# Patient Record
Sex: FEMALE | Race: NATIVE HAWAIIAN OR OTHER PACIFIC ISLANDER | NOT HISPANIC OR LATINO | Employment: UNEMPLOYED | URBAN - METROPOLITAN AREA
[De-identification: names, ages, dates, MRNs, and addresses within clinical notes are randomized per-mention and may not be internally consistent; named-entity substitution may affect disease eponyms.]

---

## 2020-03-11 ENCOUNTER — HOSPITAL ENCOUNTER (OUTPATIENT)
Facility: MEDICAL CENTER | Age: 30
End: 2020-03-11
Attending: NURSE PRACTITIONER
Payer: COMMERCIAL

## 2020-03-11 ENCOUNTER — INITIAL PRENATAL (OUTPATIENT)
Dept: OBGYN | Facility: CLINIC | Age: 30
End: 2020-03-11

## 2020-03-11 ENCOUNTER — APPOINTMENT (OUTPATIENT)
Dept: OBGYN | Facility: CLINIC | Age: 30
End: 2020-03-11
Payer: MEDICAID

## 2020-03-11 ENCOUNTER — HOSPITAL ENCOUNTER (OUTPATIENT)
Dept: LAB | Facility: MEDICAL CENTER | Age: 30
End: 2020-03-11
Attending: NURSE PRACTITIONER
Payer: COMMERCIAL

## 2020-03-11 VITALS — WEIGHT: 174 LBS | SYSTOLIC BLOOD PRESSURE: 100 MMHG | DIASTOLIC BLOOD PRESSURE: 70 MMHG

## 2020-03-11 DIAGNOSIS — Z34.83 ENCOUNTER FOR SUPERVISION OF OTHER NORMAL PREGNANCY, THIRD TRIMESTER: ICD-10-CM

## 2020-03-11 DIAGNOSIS — Z34.83 ENCOUNTER FOR SUPERVISION OF OTHER NORMAL PREGNANCY, THIRD TRIMESTER: Primary | ICD-10-CM

## 2020-03-11 DIAGNOSIS — O09.33 LATE PRENATAL CARE AFFECTING PREGNANCY IN THIRD TRIMESTER: ICD-10-CM

## 2020-03-11 LAB
ABO GROUP BLD: NORMAL
APPEARANCE UR: CLEAR
BACTERIA #/AREA URNS HPF: ABNORMAL /HPF
BASOPHILS # BLD AUTO: 0.4 % (ref 0–1.8)
BASOPHILS # BLD: 0.05 K/UL (ref 0–0.12)
BILIRUB UR QL STRIP.AUTO: NEGATIVE
BLD GP AB SCN SERPL QL: NORMAL
COLOR UR: YELLOW
EOSINOPHIL # BLD AUTO: 0.41 K/UL (ref 0–0.51)
EOSINOPHIL NFR BLD: 3.2 % (ref 0–6.9)
EPI CELLS #/AREA URNS HPF: ABNORMAL /HPF
ERYTHROCYTE [DISTWIDTH] IN BLOOD BY AUTOMATED COUNT: 40.8 FL (ref 35.9–50)
GLUCOSE UR STRIP.AUTO-MCNC: NEGATIVE MG/DL
HBV SURFACE AG SER QL: ABNORMAL
HCT VFR BLD AUTO: 36.7 % (ref 37–47)
HCV AB SER QL: NORMAL
HGB BLD-MCNC: 12.6 G/DL (ref 12–16)
HIV 1+2 AB+HIV1 P24 AG SERPL QL IA: NORMAL
HYALINE CASTS #/AREA URNS LPF: ABNORMAL /LPF
IMM GRANULOCYTES # BLD AUTO: 0.22 K/UL (ref 0–0.11)
IMM GRANULOCYTES NFR BLD AUTO: 1.7 % (ref 0–0.9)
KETONES UR STRIP.AUTO-MCNC: NEGATIVE MG/DL
LEUKOCYTE ESTERASE UR QL STRIP.AUTO: ABNORMAL
LYMPHOCYTES # BLD AUTO: 1.61 K/UL (ref 1–4.8)
LYMPHOCYTES NFR BLD: 12.6 % (ref 22–41)
MCH RBC QN AUTO: 29.9 PG (ref 27–33)
MCHC RBC AUTO-ENTMCNC: 34.3 G/DL (ref 33.6–35)
MCV RBC AUTO: 87 FL (ref 81.4–97.8)
MICRO URNS: ABNORMAL
MONOCYTES # BLD AUTO: 0.77 K/UL (ref 0–0.85)
MONOCYTES NFR BLD AUTO: 6 % (ref 0–13.4)
NEUTROPHILS # BLD AUTO: 9.71 K/UL (ref 2–7.15)
NEUTROPHILS NFR BLD: 76.1 % (ref 44–72)
NITRITE UR QL STRIP.AUTO: NEGATIVE
NRBC # BLD AUTO: 0 K/UL
NRBC BLD-RTO: 0 /100 WBC
PH UR STRIP.AUTO: 6.5 [PH] (ref 5–8)
PLATELET # BLD AUTO: 246 K/UL (ref 164–446)
PMV BLD AUTO: 9.3 FL (ref 9–12.9)
PROT UR QL STRIP: NEGATIVE MG/DL
RBC # BLD AUTO: 4.22 M/UL (ref 4.2–5.4)
RBC # URNS HPF: ABNORMAL /HPF
RBC UR QL AUTO: ABNORMAL
RH BLD: NORMAL
RUBV AB SER QL: 139 IU/ML
SP GR UR STRIP.AUTO: 1.02
TRANS CELLS #/AREA URNS HPF: ABNORMAL /HPF
TREPONEMA PALLIDUM IGG+IGM AB [PRESENCE] IN SERUM OR PLASMA BY IMMUNOASSAY: ABNORMAL
UROBILINOGEN UR STRIP.AUTO-MCNC: 1 MG/DL
WBC # BLD AUTO: 12.8 K/UL (ref 4.8–10.8)
WBC #/AREA URNS HPF: ABNORMAL /HPF

## 2020-03-11 PROCEDURE — 0500F INITIAL PRENATAL CARE VISIT: CPT | Performed by: NURSE PRACTITIONER

## 2020-03-11 PROCEDURE — 8198 PREG CTR PKG RATE (SYSTEM): Performed by: NURSE PRACTITIONER

## 2020-03-11 ASSESSMENT — ENCOUNTER SYMPTOMS
MUSCULOSKELETAL NEGATIVE: 1
NEUROLOGICAL NEGATIVE: 1
RESPIRATORY NEGATIVE: 1
GASTROINTESTINAL NEGATIVE: 1
CARDIOVASCULAR NEGATIVE: 1
EYES NEGATIVE: 1
CONSTITUTIONAL NEGATIVE: 1
PSYCHIATRIC NEGATIVE: 1

## 2020-03-11 NOTE — LETTER
"Count Your Baby's Movements  Another step to a healthy delivery    Lyudmila Serrano              Dept: [phone]    How Many Weeks Pregnant? 29w6d    Date to Begin Countin2020              How to use this chart    One way for your physician to keep track of your baby's health is by knowing how often the baby moves (or \"kicks\") in your womb.  You can help your physician to do this by using this chart every day.    Every day, you should see how many hours it takes for your baby to move 10 times.  Start in the morning, as soon as you get up.    · First, write down the time your baby moves until you get to 10.  · Check off one box every time your baby moves until you get to 10.  · Write down the time you finished counting in the last column.  · Total how long it took to count up all 10 movements.  · Finally, fill in the box that shows how long this took.  After counting 10 movements, you no longer have to count any more that day.  The next morning, just start counting again as soon as you get up.    What should you call a \"movement\"?  It is hard to say, because it will feel different from one mother to another and from one pregnancy to the next.  The important thing is that you count the movements the same way throughout your pregnancy.  If you have more questions, you should ask your physician.    Count carefully every day!  SAMPLE:  Week 28    How many hours did it take to feel 10 movements?       Start  Time     1     2     3     4     5     6     7     8     9     10   Finish Time   Mon 8:20 ·  ·  ·  ·  ·  ·  ·  ·  ·  ·  11:40                  Sat               Sun                 IMPORTANT: You should contact your physician if it takes more than two hours for you to feel 10 movements.  Each morning, write down the time and start to count the movements of your baby.  Keep track by checking off one box every time you feel one movement.  When you have " "felt 10 \"kicks\", write down the time you finished counting in the last column.  Then fill in the   box (over the check elie) for the number of hours it took.  Be sure to read the complete instructions on the previous page.            "

## 2020-03-11 NOTE — PROGRESS NOTES
Pt. Here for NOB visit today.  # [phone]  First prenatal care  Pt. States light cramping   Reports +FM.   Pharmacy verified  Pt declines CF  Chaperone offered and provided.  Pt declines TDap vaccine.

## 2020-03-11 NOTE — PROGRESS NOTES
S:  Lyudmila Serrano is a 29 y.o.  who presents for her new OB exam.  She is 29w6d with and CHAU of Estimated Date of Delivery: 20 based off of LMP . She has no complaints.  She is currently working at [employer], denies heavy lifting or chemical exposure. Denies ER visits or previous care in this pregnancy. Here from [facility].      Too Late for  AFP.  Declines CF.  Denies VB, LOF, or cramping.  Denies dysuria, vaginal DC. Reports normal fetal movement.     Pt is single and lives with other children and inlaws.  Pregnancy is unplanned but welcomed.      Discussed diet and exercise during pregnancy. Encouraged good nutrition, and daily exercise including walking or swimming. Discussed expected weight gain during pregnancy. Discussed adequate hydration during pregnancy.  Review of Systems   Constitutional: Negative.    HENT: Negative.    Eyes: Negative.    Respiratory: Negative.    Cardiovascular: Negative.    Gastrointestinal: Negative.    Genitourinary: Negative.    Musculoskeletal: Negative.    Skin: Negative.    Neurological: Negative.    Endo/Heme/Allergies: Negative.    Psychiatric/Behavioral: Negative.    All other systems reviewed and are negative.        History reviewed. No pertinent past medical history.  History reviewed. No pertinent family history.  Social History     Socioeconomic History   • Marital status: Single     Spouse name: Not on file   • Number of children: Not on file   • Years of education: Not on file   • Highest education level: Not on file   Occupational History   • Not on file   Social Needs   • Financial resource strain: Not on file   • Food insecurity     Worry: Not on file     Inability: Not on file   • Transportation needs     Medical: Not on file     Non-medical: Not on file   Tobacco Use   • Smoking status: Never Smoker   • Smokeless tobacco: Never Used   Substance and Sexual Activity   • Alcohol use: Not Currently   • Drug use: Not on file   • Sexual activity: Not  Currently     Birth control/protection: None   Lifestyle   • Physical activity     Days per week: Not on file     Minutes per session: Not on file   • Stress: Not on file   Relationships   • Social connections     Talks on phone: Not on file     Gets together: Not on file     Attends Gnosticism service: Not on file     Active member of club or organization: Not on file     Attends meetings of clubs or organizations: Not on file     Relationship status: Not on file   • Intimate partner violence     Fear of current or ex partner: Not on file     Emotionally abused: Not on file     Physically abused: Not on file     Forced sexual activity: Not on file   Other Topics Concern   • Not on file   Social History Narrative   • Not on file     OB History    Para Term  AB Living   3 2 2     2   SAB TAB Ectopic Molar Multiple Live Births             2      # Outcome Date GA Lbr Juan Carlos/2nd Weight Sex Delivery Anes PTL Lv   3 Current            2 Term      Vag-Spont   SUDHA   1 Term  40w0d    Vag-Spont   SUDHA       History of Varicella Virus: had as child  History of HSV I or II in self or partner: denies  History of Thyroid problems: denies    O:  Wt 78.9 kg (174 lb)    See Prenatal Physical.    Wet mount: denies  Physical Exam   Constitutional: She is oriented to person, place, and time and well-developed, well-nourished, and in no distress.   HENT:   Head: Normocephalic and atraumatic.   Nose: Nose normal.   Eyes: Pupils are equal, round, and reactive to light. Conjunctivae are normal.   Neck: Normal range of motion. Neck supple. No thyromegaly present.   Cardiovascular: Normal rate, regular rhythm, normal heart sounds and intact distal pulses.   Pulmonary/Chest: Effort normal and breath sounds normal.   Abdominal: Soft. Bowel sounds are normal.   Genitourinary:    Vagina normal.     Musculoskeletal: Normal range of motion.         General: No edema.   Neurological: She is alert and oriented to person, place, and time.  Gait normal.   Skin: Skin is warm and dry.   Psychiatric: Mood, memory, affect and judgment normal.   Nursing note and vitals reviewed.        A:   1.  IUP @ 29w6d per LMP        2.  S=D        3.  See problem list below        4.  Late prenatal care        5. Had negative pap 2 years ago     There are no active problems to display for this patient.        P:  1.  GC/CT         2.  Prenatal labs and 1 hr GTT ordered - lab slip given        3.  Discussed PNV, diet, avoidances and adequate water intake        4.  NOB packet given        5.  Return to office in 2 wks        6.  Complete OB US in ASAP wks            No orders of the defined types were placed in this encounter.

## 2020-03-12 LAB
C TRACH DNA SPEC QL NAA+PROBE: NEGATIVE
GLUCOSE 1H P 50 G GLC PO SERPL-MCNC: 81 MG/DL (ref 70–139)
N GONORRHOEA DNA SPEC QL NAA+PROBE: NEGATIVE
SPECIMEN SOURCE: NORMAL

## 2020-03-13 LAB
AMPHET CTO UR CFM-MCNC: NEGATIVE NG/ML
BARBITURATES CTO UR CFM-MCNC: NEGATIVE NG/ML
BENZODIAZ CTO UR CFM-MCNC: NEGATIVE NG/ML
CANNABINOIDS CTO UR CFM-MCNC: NEGATIVE NG/ML
COCAINE CTO UR CFM-MCNC: NEGATIVE NG/ML
DRUG COMMENT 753798: NORMAL
METHADONE CTO UR CFM-MCNC: NEGATIVE NG/ML
OPIATES CTO UR CFM-MCNC: NEGATIVE NG/ML
PCP CTO UR CFM-MCNC: NEGATIVE NG/ML
PROPOXYPH CTO UR CFM-MCNC: NEGATIVE NG/ML

## 2020-03-23 ENCOUNTER — APPOINTMENT (OUTPATIENT)
Dept: RADIOLOGY | Facility: IMAGING CENTER | Age: 30
End: 2020-03-23
Attending: NURSE PRACTITIONER

## 2020-03-23 ENCOUNTER — ROUTINE PRENATAL (OUTPATIENT)
Dept: OBGYN | Facility: CLINIC | Age: 30
End: 2020-03-23
Payer: MEDICAID

## 2020-03-23 VITALS — DIASTOLIC BLOOD PRESSURE: 70 MMHG | WEIGHT: 170 LBS | SYSTOLIC BLOOD PRESSURE: 112 MMHG

## 2020-03-23 DIAGNOSIS — Z34.83 ENCOUNTER FOR SUPERVISION OF OTHER NORMAL PREGNANCY IN THIRD TRIMESTER: ICD-10-CM

## 2020-03-23 DIAGNOSIS — Z34.83 ENCOUNTER FOR SUPERVISION OF OTHER NORMAL PREGNANCY, THIRD TRIMESTER: ICD-10-CM

## 2020-03-23 PROBLEM — Z34.93 ENCOUNTER FOR SUPERVISION OF NORMAL PREGNANCY, UNSPECIFIED, THIRD TRIMESTER: Status: ACTIVE | Noted: 2020-03-23

## 2020-03-23 PROCEDURE — 76805 OB US >/= 14 WKS SNGL FETUS: CPT | Mod: TC | Performed by: OBSTETRICS & GYNECOLOGY

## 2020-03-23 PROCEDURE — 90040 PR PRENATAL FOLLOW UP: CPT | Performed by: PHYSICIAN ASSISTANT

## 2020-03-23 NOTE — PROGRESS NOTES
Pt has no complaints with cramping, UCs, Vb, LOF, though pt has had incr pressure only. +FM. PNL,1 hr GTT, Hep C, UDS, GC/CT wnl - pt notified of results. Pt unsure about BTL, though is considering if female gender, so will re-ask next visit. PNV rx sent in today - pt isnt taking PNV yet, so d/w pt risk for constipation and how to incr water, fiber in diet. US done today - results pending. Will review with pt next visit or call prn. PTL precautions reviewed. Daily FKC recommended. RTC 2 wk or sooner prn.

## 2020-03-23 NOTE — PROGRESS NOTES
OB follow up   + fetal movement.  No VB, LOF or UC's.  Wt: 170lb      BP:112/70  Phone # [phone]  Preferred pharmacy confirmed.  BTL declined  Wants Rx for PNV

## 2020-03-26 DIAGNOSIS — O28.3 ABNORMAL PREGNANCY US: ICD-10-CM

## 2020-03-30 ENCOUNTER — ROUTINE PRENATAL (OUTPATIENT)
Dept: OBGYN | Facility: CLINIC | Age: 30
End: 2020-03-30

## 2020-03-30 VITALS — SYSTOLIC BLOOD PRESSURE: 124 MMHG | DIASTOLIC BLOOD PRESSURE: 70 MMHG | WEIGHT: 174 LBS

## 2020-03-30 DIAGNOSIS — Z34.83 ENCOUNTER FOR SUPERVISION OF OTHER NORMAL PREGNANCY IN THIRD TRIMESTER: ICD-10-CM

## 2020-03-30 PROCEDURE — 90040 PR PRENATAL FOLLOW UP: CPT | Performed by: PHYSICIAN ASSISTANT

## 2020-03-30 NOTE — PROGRESS NOTES
Pt has no complaints with cramping, UCs, Vb, LOF. +FM. US results wnl though incr cisterna magna seen, so Dr Xiong referral placed - pt hasnt gottne phone call for appt yet, so will call if no appt by 1 wk. Info given on Dr Xiong's office. Declines BTL and TDaP. RTC 2 wk or sooner prn.

## 2020-03-30 NOTE — PROGRESS NOTES
Pt here today for OB follow up  Pt still not sure about BTL  Reports +FM  Good # [phone]  Pharmacy Confirmed w/ pt   Declines T-dap

## 2020-04-17 ENCOUNTER — DOCUMENTATION (OUTPATIENT)
Dept: OBGYN | Facility: CLINIC | Age: 30
End: 2020-04-17

## 2020-05-21 ENCOUNTER — HOSPITAL ENCOUNTER (OUTPATIENT)
Facility: MEDICAL CENTER | Age: 30
End: 2020-05-21
Attending: NURSE PRACTITIONER
Payer: COMMERCIAL

## 2020-05-21 ENCOUNTER — ROUTINE PRENATAL (OUTPATIENT)
Dept: OBGYN | Facility: CLINIC | Age: 30
End: 2020-05-21

## 2020-05-21 VITALS — WEIGHT: 186.4 LBS | SYSTOLIC BLOOD PRESSURE: 110 MMHG | DIASTOLIC BLOOD PRESSURE: 76 MMHG

## 2020-05-21 DIAGNOSIS — Z34.83 ENCOUNTER FOR SUPERVISION OF OTHER NORMAL PREGNANCY IN THIRD TRIMESTER: ICD-10-CM

## 2020-05-21 DIAGNOSIS — Z34.83 ENCOUNTER FOR SUPERVISION OF OTHER NORMAL PREGNANCY IN THIRD TRIMESTER: Primary | ICD-10-CM

## 2020-05-21 PROCEDURE — 90040 PR PRENATAL FOLLOW UP: CPT | Performed by: NURSE PRACTITIONER

## 2020-05-21 NOTE — PATIENT INSTRUCTIONS
P:  1.  Continue FKCs.         2.  Labor precautions given.  Instructions given on where to go.  Pt receptive to education.         3.  D/w pt IOL policy.  IOL referral placed.        4.  Questions answered.         5.  Encouraged adequate water intake       6.  F/u 1wk       7.  Encouraged good hand hygiene, social distancing and avoiding sick contacts.       8.  L&D policies reviewed.       9.  GBS obtained.

## 2020-05-23 ENCOUNTER — HOSPITAL ENCOUNTER (INPATIENT)
Facility: MEDICAL CENTER | Age: 30
LOS: 1 days | End: 2020-05-24
Attending: OBSTETRICS & GYNECOLOGY | Admitting: OBSTETRICS & GYNECOLOGY
Payer: MEDICAID

## 2020-05-23 LAB
BASOPHILS # BLD AUTO: 0.2 % (ref 0–1.8)
BASOPHILS # BLD: 0.03 K/UL (ref 0–0.12)
EOSINOPHIL # BLD AUTO: 0.27 K/UL (ref 0–0.51)
EOSINOPHIL NFR BLD: 1.8 % (ref 0–6.9)
ERYTHROCYTE [DISTWIDTH] IN BLOOD BY AUTOMATED COUNT: 39.6 FL (ref 35.9–50)
ERYTHROCYTE [DISTWIDTH] IN BLOOD BY AUTOMATED COUNT: 39.7 FL (ref 35.9–50)
GP B STREP DNA SPEC QL NAA+PROBE: NEGATIVE
HCT VFR BLD AUTO: 35.1 % (ref 37–47)
HCT VFR BLD AUTO: 40.3 % (ref 37–47)
HGB BLD-MCNC: 12.5 G/DL (ref 12–16)
HGB BLD-MCNC: 14.2 G/DL (ref 12–16)
HOLDING TUBE BB 8507: NORMAL
IMM GRANULOCYTES # BLD AUTO: 0.13 K/UL (ref 0–0.11)
IMM GRANULOCYTES NFR BLD AUTO: 0.9 % (ref 0–0.9)
LYMPHOCYTES # BLD AUTO: 1.62 K/UL (ref 1–4.8)
LYMPHOCYTES NFR BLD: 10.8 % (ref 22–41)
MCH RBC QN AUTO: 30 PG (ref 27–33)
MCH RBC QN AUTO: 30.6 PG (ref 27–33)
MCHC RBC AUTO-ENTMCNC: 35.2 G/DL (ref 33.6–35)
MCHC RBC AUTO-ENTMCNC: 35.6 G/DL (ref 33.6–35)
MCV RBC AUTO: 85.2 FL (ref 81.4–97.8)
MCV RBC AUTO: 86 FL (ref 81.4–97.8)
MONOCYTES # BLD AUTO: 0.63 K/UL (ref 0–0.85)
MONOCYTES NFR BLD AUTO: 4.2 % (ref 0–13.4)
NEUTROPHILS # BLD AUTO: 12.28 K/UL (ref 2–7.15)
NEUTROPHILS NFR BLD: 82.1 % (ref 44–72)
NRBC # BLD AUTO: 0 K/UL
NRBC BLD-RTO: 0 /100 WBC
PLATELET # BLD AUTO: 194 K/UL (ref 164–446)
PLATELET # BLD AUTO: 220 K/UL (ref 164–446)
PMV BLD AUTO: 9.4 FL (ref 9–12.9)
PMV BLD AUTO: 9.5 FL (ref 9–12.9)
RBC # BLD AUTO: 4.08 M/UL (ref 4.2–5.4)
RBC # BLD AUTO: 4.73 M/UL (ref 4.2–5.4)
WBC # BLD AUTO: 15 K/UL (ref 4.8–10.8)
WBC # BLD AUTO: 17.7 K/UL (ref 4.8–10.8)

## 2020-05-23 PROCEDURE — 0KQM0ZZ REPAIR PERINEUM MUSCLE, OPEN APPROACH: ICD-10-PCS | Performed by: OBSTETRICS & GYNECOLOGY

## 2020-05-23 PROCEDURE — 36415 COLL VENOUS BLD VENIPUNCTURE: CPT

## 2020-05-23 PROCEDURE — 59409 OBSTETRICAL CARE: CPT | Performed by: OBSTETRICS & GYNECOLOGY

## 2020-05-23 PROCEDURE — 10907ZC DRAINAGE OF AMNIOTIC FLUID, THERAPEUTIC FROM PRODUCTS OF CONCEPTION, VIA NATURAL OR ARTIFICIAL OPENING: ICD-10-PCS | Performed by: OBSTETRICS & GYNECOLOGY

## 2020-05-23 PROCEDURE — 700111 HCHG RX REV CODE 636 W/ 250 OVERRIDE (IP): Performed by: NURSE PRACTITIONER

## 2020-05-23 PROCEDURE — 770002 HCHG ROOM/CARE - OB PRIVATE (112)

## 2020-05-23 PROCEDURE — 85027 COMPLETE CBC AUTOMATED: CPT

## 2020-05-23 PROCEDURE — 3E0234Z INTRODUCTION OF SERUM, TOXOID AND VACCINE INTO MUSCLE, PERCUTANEOUS APPROACH: ICD-10-PCS | Performed by: OBSTETRICS & GYNECOLOGY

## 2020-05-23 PROCEDURE — 304965 HCHG RECOVERY SERVICES

## 2020-05-23 PROCEDURE — 85025 COMPLETE CBC W/AUTO DIFF WBC: CPT

## 2020-05-23 PROCEDURE — 700101 HCHG RX REV CODE 250

## 2020-05-23 PROCEDURE — 59409 OBSTETRICAL CARE: CPT

## 2020-05-23 PROCEDURE — 90471 IMMUNIZATION ADMIN: CPT

## 2020-05-23 PROCEDURE — 700105 HCHG RX REV CODE 258: Performed by: NURSE PRACTITIONER

## 2020-05-23 PROCEDURE — 700111 HCHG RX REV CODE 636 W/ 250 OVERRIDE (IP)

## 2020-05-23 PROCEDURE — 90715 TDAP VACCINE 7 YRS/> IM: CPT

## 2020-05-23 RX ORDER — DOCUSATE SODIUM 100 MG/1
100 CAPSULE, LIQUID FILLED ORAL 2 TIMES DAILY PRN
Status: DISCONTINUED | OUTPATIENT
Start: 2020-05-23 | End: 2020-05-24 | Stop reason: HOSPADM

## 2020-05-23 RX ORDER — VITAMIN A ACETATE, BETA CAROTENE, ASCORBIC ACID, CHOLECALCIFEROL, .ALPHA.-TOCOPHEROL ACETATE, DL-, THIAMINE MONONITRATE, RIBOFLAVIN, NIACINAMIDE, PYRIDOXINE HYDROCHLORIDE, FOLIC ACID, CYANOCOBALAMIN, CALCIUM CARBONATE, FERROUS FUMARATE, ZINC OXIDE, CUPRIC OXIDE 3080; 12; 120; 400; 1; 1.84; 3; 20; 22; 920; 25; 200; 27; 10; 2 [IU]/1; UG/1; MG/1; [IU]/1; MG/1; MG/1; MG/1; MG/1; MG/1; [IU]/1; MG/1; MG/1; MG/1; MG/1; MG/1
1 TABLET, FILM COATED ORAL EVERY MORNING
Status: DISCONTINUED | OUTPATIENT
Start: 2020-05-23 | End: 2020-05-24 | Stop reason: HOSPADM

## 2020-05-23 RX ORDER — METHYLERGONOVINE MALEATE 0.2 MG/ML
0.2 INJECTION INTRAVENOUS
Status: DISCONTINUED | OUTPATIENT
Start: 2020-05-23 | End: 2020-05-23 | Stop reason: HOSPADM

## 2020-05-23 RX ORDER — ONDANSETRON 4 MG/1
4 TABLET, ORALLY DISINTEGRATING ORAL EVERY 6 HOURS PRN
Status: DISCONTINUED | OUTPATIENT
Start: 2020-05-23 | End: 2020-05-24 | Stop reason: HOSPADM

## 2020-05-23 RX ORDER — LIDOCAINE HYDROCHLORIDE 10 MG/ML
INJECTION, SOLUTION INFILTRATION; PERINEURAL
Status: COMPLETED
Start: 2020-05-23 | End: 2020-05-23

## 2020-05-23 RX ORDER — MISOPROSTOL 200 UG/1
800 TABLET ORAL
Status: DISCONTINUED | OUTPATIENT
Start: 2020-05-23 | End: 2020-05-23 | Stop reason: HOSPADM

## 2020-05-23 RX ORDER — IBUPROFEN 600 MG/1
600 TABLET ORAL EVERY 6 HOURS PRN
Status: DISCONTINUED | OUTPATIENT
Start: 2020-05-23 | End: 2020-05-24 | Stop reason: HOSPADM

## 2020-05-23 RX ORDER — BISACODYL 10 MG
10 SUPPOSITORY, RECTAL RECTAL PRN
Status: DISCONTINUED | OUTPATIENT
Start: 2020-05-23 | End: 2020-05-24 | Stop reason: HOSPADM

## 2020-05-23 RX ORDER — ONDANSETRON 2 MG/ML
4 INJECTION INTRAMUSCULAR; INTRAVENOUS EVERY 6 HOURS PRN
Status: DISCONTINUED | OUTPATIENT
Start: 2020-05-23 | End: 2020-05-24 | Stop reason: HOSPADM

## 2020-05-23 RX ORDER — SODIUM CHLORIDE, SODIUM LACTATE, POTASSIUM CHLORIDE, CALCIUM CHLORIDE 600; 310; 30; 20 MG/100ML; MG/100ML; MG/100ML; MG/100ML
INJECTION, SOLUTION INTRAVENOUS CONTINUOUS
Status: ACTIVE | OUTPATIENT
Start: 2020-05-23 | End: 2020-05-23

## 2020-05-23 RX ORDER — ACETAMINOPHEN 325 MG/1
650 TABLET ORAL EVERY 4 HOURS PRN
Status: DISCONTINUED | OUTPATIENT
Start: 2020-05-23 | End: 2020-05-24 | Stop reason: HOSPADM

## 2020-05-23 RX ORDER — SODIUM CHLORIDE, SODIUM LACTATE, POTASSIUM CHLORIDE, CALCIUM CHLORIDE 600; 310; 30; 20 MG/100ML; MG/100ML; MG/100ML; MG/100ML
INJECTION, SOLUTION INTRAVENOUS PRN
Status: DISCONTINUED | OUTPATIENT
Start: 2020-05-23 | End: 2020-05-24 | Stop reason: HOSPADM

## 2020-05-23 RX ADMIN — TETANUS TOXOID, REDUCED DIPHTHERIA TOXOID AND ACELLULAR PERTUSSIS VACCINE, ADSORBED 0.5 ML: 5; 2.5; 8; 8; 2.5 SUSPENSION INTRAMUSCULAR at 22:20

## 2020-05-23 RX ADMIN — OXYTOCIN 125 ML/HR: 10 INJECTION, SOLUTION INTRAMUSCULAR; INTRAVENOUS at 10:20

## 2020-05-23 RX ADMIN — LIDOCAINE HYDROCHLORIDE: 10 INJECTION, SOLUTION INFILTRATION; PERINEURAL at 09:30

## 2020-05-23 RX ADMIN — SODIUM CHLORIDE, POTASSIUM CHLORIDE, SODIUM LACTATE AND CALCIUM CHLORIDE: 600; 310; 30; 20 INJECTION, SOLUTION INTRAVENOUS at 07:55

## 2020-05-23 RX ADMIN — OXYTOCIN 2000 ML/HR: 10 INJECTION, SOLUTION INTRAMUSCULAR; INTRAVENOUS at 09:09

## 2020-05-23 SDOH — ECONOMIC STABILITY: FOOD INSECURITY: WITHIN THE PAST 12 MONTHS, THE FOOD YOU BOUGHT JUST DIDN'T LAST AND YOU DIDN'T HAVE MONEY TO GET MORE.: NEVER TRUE

## 2020-05-23 SDOH — ECONOMIC STABILITY: FOOD INSECURITY: WITHIN THE PAST 12 MONTHS, YOU WORRIED THAT YOUR FOOD WOULD RUN OUT BEFORE YOU GOT MONEY TO BUY MORE.: NEVER TRUE

## 2020-05-23 SDOH — ECONOMIC STABILITY: TRANSPORTATION INSECURITY
IN THE PAST 12 MONTHS, HAS THE LACK OF TRANSPORTATION KEPT YOU FROM MEDICAL APPOINTMENTS OR FROM GETTING MEDICATIONS?: NO

## 2020-05-23 SDOH — ECONOMIC STABILITY: TRANSPORTATION INSECURITY
IN THE PAST 12 MONTHS, HAS LACK OF TRANSPORTATION KEPT YOU FROM MEETINGS, WORK, OR FROM GETTING THINGS NEEDED FOR DAILY LIVING?: NO

## 2020-05-23 ASSESSMENT — LIFESTYLE VARIABLES: EVER_SMOKED: NEVER

## 2020-05-23 ASSESSMENT — EDINBURGH POSTNATAL DEPRESSION SCALE (EPDS)
THINGS HAVE BEEN GETTING ON TOP OF ME: YES, SOMETIMES I HAVEN'T BEEN COPING AS WELL AS USUAL
I HAVE BEEN ANXIOUS OR WORRIED FOR NO GOOD REASON: HARDLY EVER
THE THOUGHT OF HARMING MYSELF HAS OCCURRED TO ME: NEVER
I HAVE BEEN SO UNHAPPY THAT I HAVE HAD DIFFICULTY SLEEPING: NOT VERY OFTEN
I HAVE FELT SAD OR MISERABLE: NO, NOT AT ALL
I HAVE LOOKED FORWARD WITH ENJOYMENT TO THINGS: AS MUCH AS I EVER DID
I HAVE FELT SCARED OR PANICKY FOR NO GOOD REASON: YES, SOMETIMES
I HAVE BEEN ABLE TO LAUGH AND SEE THE FUNNY SIDE OF THINGS: AS MUCH AS I ALWAYS COULD
I HAVE BLAMED MYSELF UNNECESSARILY WHEN THINGS WENT WRONG: NOT VERY OFTEN
I HAVE BEEN SO UNHAPPY THAT I HAVE BEEN CRYING: NO, NEVER

## 2020-05-23 ASSESSMENT — PATIENT HEALTH QUESTIONNAIRE - PHQ9
SUM OF ALL RESPONSES TO PHQ9 QUESTIONS 1 AND 2: 0
1. LITTLE INTEREST OR PLEASURE IN DOING THINGS: NOT AT ALL
2. FEELING DOWN, DEPRESSED, IRRITABLE, OR HOPELESS: NOT AT ALL

## 2020-05-23 ASSESSMENT — ENCOUNTER SYMPTOMS
MUSCULOSKELETAL NEGATIVE: 1
CONSTITUTIONAL NEGATIVE: 1
CARDIOVASCULAR NEGATIVE: 1
GASTROINTESTINAL NEGATIVE: 1
EYES NEGATIVE: 1
NEUROLOGICAL NEGATIVE: 1
RESPIRATORY NEGATIVE: 1
PSYCHIATRIC NEGATIVE: 1

## 2020-05-23 NOTE — LACTATION NOTE
Spoke with mom to introduce lactation services and assist with breastfeeding. Mom states she nursed her now 9 year old for one year. Nipples everted bilaterally and breasts appear wide spaced.     Baby alert and fussy. Asked mom if I could assist her with latch and she declined saying she was OK.     Baby is now 5 hours old and has nursed twice and had one stool and one urine diaper. Mom encouraged to call for RN/LC assistance as needed.

## 2020-05-23 NOTE — PROGRESS NOTES
"Progress Note    Subjective: Patient is a 29-year-old G3, P2 at 40 weeks and 2 days who was admitted in active labor.  Patient desires no pain medication during labor.  She has no complaints currently    Objective Data:  Recent Labs     05/23/20  0750   WBC 15.0*   RBC 4.73   HEMOGLOBIN 14.2   HEMATOCRIT 40.3   MCV 85.2   MCH 30.0   MCHC 35.2*   RDW 39.7   PLATELETCT 220   MPV 9.5           Vitals:    05/23/20 0700 05/23/20 0731 05/23/20 0800   BP:  123/83    Pulse:  94    Resp:   20   Temp:   36.5 °C (97.7 °F)   Weight: 79.4 kg (175 lb)     Height: 1.575 m (5' 2\")       SVE: 7 cm/100/-1  AROM clr    FHR tracing:  Fetal heart rate baseline is in the 140s with accelerations and rare variable decelerations.  Moderate fetal heart rate variability is present.  On tocometer irregular contractions were noted 2 to 3 minutes apart.  Fetal heart rate tracing is currently category 1      Current Facility-Administered Medications   Medication Dose Route Frequency Provider Last Rate Last Dose   • LR infusion   Intravenous Continuous Bernadette Ervin, A.P.R.N.       • fentaNYL (SUBLIMAZE) injection 50 mcg  50 mcg Intravenous Q HOUR PRN Bernadette Butler, A.P.R.N.       • fentaNYL (SUBLIMAZE) injection 100 mcg  100 mcg Intravenous Q HOUR PRN Bernadette Ervin, A.P.R.N.       • miSOPROStol (CYTOTEC) tablet 800 mcg  800 mcg Rectal Once PRN Bernadette Butler, A.P.R.N.       • methylergonovine (METHERGINE) injection 0.2 mg  0.2 mg Intramuscular Once PRN Bernadette Ervin, A.P.R.N.       • oxytocin (PITOCIN) 20 UNITS/1000ML LR                Assessment:  29-year-old G3, P2 at 40 weeks and 2 days gestation in active labor  Status post amniotomy with clear fluid  Category 1 fetal heart rate tracing  Desires no pain medication    Plan:  We will continue present management  Labor management and plan of care reviewed  "

## 2020-05-23 NOTE — PROGRESS NOTES
0725- Pt here for OB check monitros applied, sve 7/100/-1  0755- IV started by Dr. Cunningham  0800- admit complete  0909-  viable female infant apgars 8/9  1045- pt up to the bathroom  1100- pt transferred to room S323, bedside report given to Geri WEBER RN- poc discussed

## 2020-05-23 NOTE — L&D DELIVERY NOTE
[facility] SPONTANEOUS VAGINAL DELIVERY PRODEDURE NOTE    PATIENT ID:  NAME:  Lyudmila Serrano  MRN:               [id number]  YOB: 1990    On 2020  at 09:09, this 29 y.o., now  40w2d , GBS unknown female delivered via  under no anesthesia a viable female infant, weight pending, with APGAR scores of 8 and 9 at one and five minutes. There was no nuchal cord and was bulb suctioned at delivery. Cord was doubly clamped, cut and infant handed to RN in attendance. An intact placenta was delivered spontaneously at 09:14 with 3 vessel cord. Upon vaginal exam, there was a 2nd degree prineal laceration which was repaired using 3.0 Vicryl in the usual sterile fashion, with 10 mL of 1% lidocaine used for local anesthetic during the repair. Estimated blood loss was 200cc. Patient will be transferred to postpartum in stable condition and infant to  nursery.    Delivery attended by Dr. Glenn Grant who was present for the entire delivery.

## 2020-05-23 NOTE — H&P
Obstetrics & Gynecology History & Physical Note    Date of Service  2020    Chief Complaint  Contractions    History of Presenting Illness  Lyudmila Serrano is a 29 y.o.  at 40w2d 2020, by Last Menstrual Period. Patient's last menstrual period was 08/15/2019. She is being admitted for labor management.  Her contractions started yesterday and continued through the day.  They started to get stronger and closer last night but not strong enough to come in until this morning.  She denies LOF, vaginal bleeding but did have some spotting yesterday when her contractions started.      Prenatal care is at [facility] starting at 29 weeks for a total of 4 visits and is complicated by:      Patient Active Problem List    Diagnosis Date Noted   • Abnormal pregnancy US - Dr Xiong US WNL 2020   • Encounter for supervision of normal pregnancy 2020   • Late prenatal care affecting pregnancy in third trimester 2020       Obstetric History  OB History    Para Term  AB Living   3 2 2     2   SAB TAB Ectopic Molar Multiple Live Births             2      # Outcome Date GA Lbr Juan Carlos/2nd Weight Sex Delivery Anes PTL Lv   3 Current            2 Term 08 40w0d  2.722 kg (6 lb) M Vag-Spont  N SUDHA   1 Term 07 40w0d  3.175 kg (7 lb) M Vag-Spont   SUDHA       Gynecologic History  Denies hx of abnormal pap, STI or HSV    Review of Systems  Review of Systems   Constitutional: Negative.    HENT: Negative.    Eyes: Negative.    Respiratory: Negative.    Cardiovascular: Negative.    Gastrointestinal: Negative.    Genitourinary: Negative.    Musculoskeletal: Negative.    Skin: Negative.    Neurological: Negative.    Endo/Heme/Allergies: Negative.    Psychiatric/Behavioral: Negative.    All other systems reviewed and are negative.      Medical History   has no past medical history on file.    Surgical History   has no past surgical history on file.     Family History  family history is not on file.     Social  History   reports that she has never smoked. She has never used smokeless tobacco. She reports previous alcohol use.    Allergies  No Known Allergies    Medications  Prior to Admission Medications   Prescriptions Last Dose Informant Patient Reported? Taking?   Prenatal Multivit-Min-Fe-FA (PRENATAL VITAMINS) 0.8 MG Tab   No No   Sig: Take 1 Tab by mouth every day.      Facility-Administered Medications: None       Physical Exam  Vitals:    05/23/20 0700   Weight: 83.9 kg (185 lb)       General:   alert, cooperative, no distress   Skin:   normal   HEENT:  PERRLA and extraocular movements intact   Lungs:   clear to auscultation bilaterally   Heart:   regular rate and rhythm, no pedal edema   Breasts:   deferred   Abdomen:  Abdomen soft, non-tender; gravid.   Pelvis: EFW: 7lbs   FHT:  135  BPM Fetal heart variability: moderate  Fetal Heart Rate decelerations: variable  Fetal Heart Rate accelerations: yes   [place]:     Presentations:   vertex by RN VE   Cervix:     Dilation:  7    Effacement:  100    Station:   -2    Consistency:      Position:         Laboratory:  Prenatal Results     General (Most Recent Result)     Test Value Reference Range Date Time    ABO O   03/11/20 1550    Rh POS   03/11/20 1550    Antibody screen NEG   03/11/20 1550    HbA1c        Gonorrhea Negative  Negative 03/11/20 1622    Chlamydia  by PCR Negative  Negative 03/11/20 1622    Chlamydia by DNA        RPR/Syphilus Non-Reactive  Non-Reactive 03/11/20 1550    HSV 1/2 by PCR (non-serum)        HSV 1/2 (serum)        HSV 1        HSV 2        HPV (16)        HPV (18)        HPV (other)        HBsAg Non-Reactive  Non-Reactive 03/11/20 1550    HIV-1 HIV-2 Antibodies Non-Reactive  Non Reactive 03/11/20 1550    Rubella 139.00 IU/mL  03/11/20 1550    Tb              Pap Smear (Most Recent Result)     Test Value Reference Range Date Time    Pap smear        Pap smear w/HPV        Pap smear w/CTNG        Pap smar w/HPV CTNG        Pap smear (reflex HPV  ACUS)        Pap smear (reflex HPV ASCUS w/CTNG)        Pathology gyn specimen              Urinalysis (Most Recent Result)     Test Value Reference Range Date Time    Urinalysis        Urinalysis (culture if indicated)        POC urinalysis        Urine drug screen        Urine drug screen (w/o conf)        Urine culture ([id number])        Urine culture ([id number])              1st Trimester     Test Value Reference Range Date Time    Hgb        Hct        Fasting Glucose Tolerance        GTT, 1 hour        GTT, 2 hours        GTT, 3 hours              2nd Trimester     Test Value Reference Range Date Time    Hgb        Hct        Urinalysis        Urine Culture        AST        ALT        Uric Acid        Fasting Glucose Tolerance        GTT, 1 hour        GTT, 2 hours        GTT, 3 hours        Urine Protein/Creatinine Ratio              3rd Trimester     Test Value Reference Range Date Time    Hgb 12.6 g/dL 12.0 - 16.0 20 1550    Hct 36.7 % 37.0 - 47.0 20 1550    Platelet count 246 K/uL 164 - 446 20 1550    GBS (KRISHNA BROTH)        GBS (Direct)        Urinalysis        Urine Culture        Urine Drug Screen        Urine Protein/Creatinine Ratio              Congenital Disease Screening     Test Value Reference Range Date Time    First Trimester Screen        Quad Screen        Sickle Cell        Thalassemia        [facility]        Cystic Fibrosis Carrier Study                      Urinalysis:    No results found     Imagin2020 Dr Xiong, normal anatomy scan, SURYA 21      Assessment:  29 y.o.  40w2d who presents with   Active labor  Intact membranes    Plan:  No new Assessment & Plan notes have been filed under this hospital service since the last note was generated.  Service: Obstetrics & Gynecology    1. Admit to L&D  2. Anticipate   3.GBS: pending, collected , no risk factors to treat  4. Pain Control: desires unmedicated    VTE prophylaxis: pt is ambulatory    Bernadette Mueller  BRAD burciaga MD

## 2020-05-24 VITALS
TEMPERATURE: 98.7 F | HEART RATE: 77 BPM | RESPIRATION RATE: 18 BRPM | SYSTOLIC BLOOD PRESSURE: 115 MMHG | WEIGHT: 175 LBS | OXYGEN SATURATION: 97 % | BODY MASS INDEX: 32.2 KG/M2 | HEIGHT: 62 IN | DIASTOLIC BLOOD PRESSURE: 73 MMHG

## 2020-05-24 NOTE — PROGRESS NOTES
1120- Patient arrived to Room S323.  Report received from ARNULFO Barry RN.  Patient assessment done.  IV patent, infusing LR with 20 units pitocin at 125 ml/hr.  Discussed pain management with patient. Patient prefers to call for pain intervention as needed.  Patient oriented to room, call system, and infant security.  Reviewed plan of care.  Patient verbalized understanding.  FOB at bedside.

## 2020-05-24 NOTE — PROGRESS NOTES
Assessment completed. Fundus firm and light lochia.  VSS. Patient denies pain at this time. Will call if pain med intervention needed.

## 2020-05-24 NOTE — DISCHARGE INSTRUCTIONS
POSTPARTUM DISCHARGE INSTRUCTIONS FOR MOM    YOB: 1990   Age: 29 y.o.               Admit Date: 2020     Discharge Date: 2020  Attending Doctor:  Glenn Grant M.D.                  Allergies:  Patient has no known allergies.    Discharged to home by car. Discharged via wheelchair, hospital escort: Yes.  Special equipment needed: Not Applicable  Belongings with: Personal  Be sure to schedule a follow-up appointment with your primary care doctor or any specialists as instructed.     Discharge Plan:   Diet Plan: Discussed  Activity Level: Discussed  Confirmed Follow up Appointment: Patient to Call and Schedule Appointment  Confirmed Symptoms Management: Discussed  Medication Reconciliation Updated: Yes    REASONS TO CALL YOUR OBSTETRICIAN:  1.   Persistent fever or shaking chills (Temperature higher than 100.4)  2.   Heavy bleeding (soaking more than 1 pad per hour); Passing clots  3.   Foul odor from vagina  4.   Mastitis (Breast infection; breast pain, chills, fever, redness)  5.   Urinary pain, burning or frequency  6.   Episiotomy infection  7.   Abdominal incision infection  8.   Severe depression longer than 24 hours    HAND WASHING  · Prior to handling the baby.  · Before breastfeeding or bottle feeding baby.  · After using the bathroom or changing the baby's diaper.    WOUND CARE  Ask your physician for additional care instructions.  In general:    ·  Incision:      · Keep clean and dry.    · Do NOT lift anything heavier than your baby for up to 6 weeks.    · There should not be any opening or pus.      VAGINAL CARE  · Nothing inside vagina for 6 weeks: no sexual intercourse, tampons or douching.  · Bleeding may continue for 2-4 weeks.  Amount may vary.    · Call your physician for heavy bleeding which means soaking more than 1 pad per hour    BIRTH CONTROL  · It is possible to become pregnant at any time after delivery and while breastfeeding.  · Plan to discuss a method of  "birth control with your physician at your follow up visit. visit.    DIET AND ELIMINATION  · Eating more fiber (bran cereal, fruits, and vegetables) and drinking plenty of fluids will help to avoid constipation.  · Urinary frequency after childbirth is normal.    POSTPARTUM BLUES  During the first few days after birth, you may experience a sense of the \"blues\" which may include impatience, irritability or even crying.  These feeling come and go quickly.  However, as many as 1 in 10 women experience emotional symptoms known as postpartum depression.    Postpartum depression:  May start as early as the second or third day after delivery or take several weeks or months to develop.  Symptoms of \"blues\" are present, but are more intense:  Crying spells; loss of appetite; feelings of hopelessness or loss of control; fear of touching the baby; over concern or no concern at all about the baby; little or no concern about your own appearance/caring for yourself; and/or inability to sleep or excessive sleeping.  Contact your physician if you are experiencing any of these symptoms.    Crisis Hotline:  · [place] Crisis Hotline:  4-836-AVCKWZN  Or 1-939.548.1076  · [place] Crisis Hotline:  1-498.652.2946  Or 828-404-0338    PREVENTING SHAKEN BABY:  If you are angry or stressed, PUT THE BABY IN THE CRIB, step away, take some deep breaths, and wait until you are calm to care for the baby.  DO NOT SHAKE THE BABY.  You are not alone, call a supporter for help.    · Crisis Call Center 24/7 crisis line 935-364-6926 or 1-386.819.6787  · You can also text them, text \"ANSWER\" to 005534    QUIT SMOKING/TOBACCO USE:  I understand the use of any tobacco products increases my chance of suffering from future heart disease and could cause other illnesses which may shorten my life. Quitting the use of tobacco products is the single most important thing I can do to improve my health. For further information on smoking / tobacco cessation call a " Toll Free Quit Line at 1-350.454.4278 (*National Cancer [place]) or 1-601.199.6166 (American Lung Association) or you can access the web based program at www.lungusa.org.    · [place] Tobacco Users Help Line:  (836) 369-2919       Toll Free: 1-407.336.6021  · Quit Tobacco Program [facility] (728)085-6739    DEPRESSION / SUICIDE RISK:  As you are discharged from this [facility], it is important to learn how to keep safe from harming yourself.    Recognize the warning signs:  · Abrupt changes in personality, positive or negative- including increase in energy   · Giving away possessions  · Change in eating patterns- significant weight changes-  positive or negative  · Change in sleeping patterns- unable to sleep or sleeping all the time   · Unwillingness or inability to communicate  · Depression  · Unusual sadness, discouragement and loneliness  · Talk of wanting to die  · Neglect of personal appearance   · Rebelliousness- reckless behavior  · Withdrawal from people/activities they love  · Confusion- inability to concentrate     If you or a loved one observes any of these behaviors or has concerns about self-harm, here's what you can do:  · Talk about it- your feelings and reasons for harming yourself  · Remove any means that you might use to hurt yourself (examples: pills, rope, extension cords, firearm)  · Get professional help from the community (Mental Health, Substance Abuse, psychological counseling)  · Do not be alone:Call your Safe Contact- someone whom you trust who will be there for you.  · Call your local CRISIS HOTLINE 515-2858 or 248-310-2210  · Call your local Children's Mobile Crisis Response Team [place] (626) 820-4336 or www.[employer]  · Call the toll free National Suicide Prevention Hotlines   · National Suicide Prevention Lifeline 730-715-ZDVJ (4093)  · National Hope Line Network 800-SUICIDE (322-5126)    DISCHARGE SURVEY:  Thank you for choosing  [facility].  We hope we provided you with very good care.  You may be receiving a survey in the mail.  Please fill it out.  Your opinion is valuable to us.    ADDITIONAL EDUCATIONAL MATERIALS GIVEN TO PATIENT:        My signature on this form indicates that:  1.  I have reviewed and understand the above information  2.  My questions regarding this information have been answered to my satisfaction.  3.  I have formulated a plan with my discharge nurse to obtain my prescribed medication for home.

## 2020-05-24 NOTE — PROGRESS NOTES
Pt discharged at approximately 1305 via wheelchair with hospital escort. Infant placed in carseat by parent and checked by RN. PT discharge instructions provided approximately 1100 no prescriptions ordered by MD. Checked armbands. Clamp and cuddles removed. No further questions at this time.

## 2020-05-24 NOTE — LACTATION NOTE
Mother reports she breast fed her other children for 1 year each. Breasts are widely spaced and nipples are large. She reports she produced plenty of milk for her other children and she feels breastfeeding is going well. She reports her nipples are slightly sore, reviewed positioning at breast and methods to achieve deeper latch. Infant sleepy and not ready to feed, encouraged mother to call for assist at next feed if techniques to deepen latch are not effective. Reviewed waking techniques, frequency/duration of feeds, hunger cues, cluster feeding, and expected infant urine/stool output. Plan is to breast feed on demand at least 8 or more times 24 hours. Mother denies questions/concerns, will call for assist if desired prior to discharge.

## 2020-05-24 NOTE — DISCHARGE SUMMARY
Discharge Summary:      Lyudmila Serrano    Admit Date:   2020  Discharge Date:  2020     Admitting diagnosis:  Delivery  Labor and delivery indication for care or intervention  Discharge Diagnosis: Status post vaginal, spontaneous.  Pregnancy Complications: none  Tubal Ligation:  no        History:  No past medical history on file.  OB History    Para Term  AB Living   3 3 3     3   SAB TAB Ectopic Molar Multiple Live Births           0 3      # Outcome Date GA Lbr Juan Carlos/2nd Weight Sex Delivery Anes PTL Lv   3 Term 20 40w2d  3.205 kg (7 lb 1.1 oz) F Vag-Spont None N SUDHA   2 Term 08 40w0d  2.722 kg (6 lb) M Vag-Spont  N SUDHA   1 Term 07 40w0d  3.175 kg (7 lb) M Vag-Spont   SUDHA        Patient has no known allergies.  Patient Active Problem List    Diagnosis Date Noted   • Abnormal pregnancy US - Dr Xiong US [  ]  2020   • Encounter for supervision of normal pregnancy 2020   • Late prenatal care affecting pregnancy in third trimester 2020        Hospital Course:   29 y.o. , now para 3, was admitted with the above mentioned diagnosis, underwent Active Labor, vaginal, spontaneous. Patient postpartum course was unremarkable, with progressive advancement in diet , ambulation and toleration of oral analgesia. Patient without complaints today and desires discharge.      Vitals:    20 1115 20 1400 20 1800 20 0600   BP: 116/74 133/83 125/78 115/73   Pulse: 89 94 87 77   Resp: 18 18 18 18   Temp: 36.9 °C (98.5 °F) 36.3 °C (97.3 °F) 36.5 °C (97.7 °F) 37.1 °C (98.7 °F)   TempSrc: Temporal Temporal Temporal Temporal   SpO2: 96% 92% 100% 97%   Weight:       Height:           Current Facility-Administered Medications   Medication Dose   • ondansetron (ZOFRAN ODT) dispertab 4 mg  4 mg    Or   • ondansetron (ZOFRAN) syringe/vial injection 4 mg  4 mg   • oxytocin (PITOCIN) infusion (for postpartum)   mL/hr   • ibuprofen (MOTRIN) tablet 600 mg   600 mg   • acetaminophen (TYLENOL) tablet 650 mg  650 mg   • LR infusion     • docusate sodium (COLACE) capsule 100 mg  100 mg   • bisacodyl (DULCOLAX) suppository 10 mg  10 mg   • magnesium hydroxide (MILK OF MAGNESIA) suspension 30 mL  30 mL   • prenatal plus vitamin (STUARTNATAL 1+1) 27-1 MG tablet 1 Tab  1 Tab       Exam:  Breast Exam: negative  Abdomen: Abdomen soft, non-tender. BS normal. No masses,  No organomegaly  Fundus Non Tender: yes  Incision: none  Perineum: perineum intact with second degree repair  Extremity: extremities, peripheral pulses and reflexes normal, no edema, redness or tenderness in the calves or thighs     Labs:  Recent Labs     05/23/20  0750 05/23/20  1851   WBC 15.0* 17.7*   RBC 4.73 4.08*   HEMOGLOBIN 14.2 12.5   HEMATOCRIT 40.3 35.1*   MCV 85.2 86.0   MCH 30.0 30.6   MCHC 35.2* 35.6*   RDW 39.7 39.6   PLATELETCT 220 194   MPV 9.5 9.4        Activity:   Discharge to home  Pelvic Rest x 6 weeks    Assessment:  normal postpartum course  Discharge Assessment: No areas of skin breakdown/redness; surgical incision intact/healing     Follow up: .C or [facility] in 5 weeks for vaginal ; 1 week for incision check.      Discharge Meds:   No current outpatient medications on file.     Patient states no need for discharge medications. She does not want any contraception at this time. Will follow up at office. Breastfeeding successfully.     JOSE CondeP.

## 2020-05-24 NOTE — PROGRESS NOTES
Report from Geri VILLASEÑOR. Assumed care. Received on her room holding her NB. POC updated. FOB @ bedside involved in the care. Needs attended. Call light within reach.

## 2021-05-27 ENCOUNTER — HOSPITAL ENCOUNTER (EMERGENCY)
Facility: MEDICAL CENTER | Age: 31
End: 2021-05-27
Attending: EMERGENCY MEDICINE

## 2021-05-27 VITALS
OXYGEN SATURATION: 95 % | WEIGHT: 174.6 LBS | BODY MASS INDEX: 32.13 KG/M2 | HEART RATE: 97 BPM | SYSTOLIC BLOOD PRESSURE: 117 MMHG | TEMPERATURE: 99.1 F | HEIGHT: 62 IN | DIASTOLIC BLOOD PRESSURE: 76 MMHG | RESPIRATION RATE: 18 BRPM

## 2021-05-27 DIAGNOSIS — H66.90 ACUTE OTITIS MEDIA, UNSPECIFIED OTITIS MEDIA TYPE: ICD-10-CM

## 2021-05-27 PROCEDURE — 99281 EMR DPT VST MAYX REQ PHY/QHP: CPT

## 2021-05-27 RX ORDER — AMOXICILLIN AND CLAVULANATE POTASSIUM 875; 125 MG/1; MG/1
1 TABLET, FILM COATED ORAL 2 TIMES DAILY
Qty: 20 TABLET | Refills: 0 | Status: SHIPPED | OUTPATIENT
Start: 2021-05-27 | End: 2021-06-06

## 2021-05-27 ASSESSMENT — LIFESTYLE VARIABLES: DO YOU DRINK ALCOHOL: NO

## 2021-05-27 NOTE — ED TRIAGE NOTES
"Chief Complaint   Patient presents with   • Ear Pain     started in the right side and now both x 1 week. Pt reports fevers at home. 99Degrees in triage.      /76   Pulse 97   Temp 37.3 °C (99.1 °F) (Temporal)   Resp 18   Ht 1.575 m (5' 2\")   Wt 79.2 kg (174 lb 9.7 oz)   SpO2 95%   BMI 31.94 kg/m²   Pt placed back in lobby, educated on triage process, and told to inform staff of any change in condition.     "

## 2021-05-28 NOTE — ED NOTES
Pt ambulatory with steady gait to YL55, pt sitting on gurney, call light in reach, chart up for ERP.

## 2021-05-28 NOTE — ED PROVIDER NOTES
"ED Provider Note    Scribed for Sarah Melgoza M.D. by Elly Yanez. 5/27/2021  5:59 PM    Primary care provider: Pcp Pt States None  Means of arrival: Walk In  History obtained from: Patient  History limited by: None   CHIEF COMPLAINT  Chief Complaint   Patient presents with    Ear Pain     started in the right side and now both x 1 week. Pt reports fevers at home. 99Degrees in triage.        HPI  Lyudmila Serrano is a 30 y.o. female who presents to the [facility] ED for bilateral ear pain onset 1 week ago. The patient states while she was blowing her nose one week ago she developed pain in her right ear that spread to her left ear. She says her pain is intermittent and is most active in the early evening and night time. Patient reports developing green mucous 3 days ago, but says this symptom has since revolved. She denies swimming recently or sitting in the bath tub and letting the water get into her ears. After the patient's pain failed to resolve, she was prompted to come into the [facility] ED this evening for further evaluation. Upon arrival to the ED the patient reports she feels like her ears are \"clogged.\" No alleviating factors were noted, and ear pain is exacerbated by bending now. Patient has associated green mucous production, sinus pain, sinus pressure, upper dental pain, mild intermittent headache, tactile fever, and rhinorrhea, but denies ear drainage or cough. She does not smoke, drink alcohol, or use drugs. Patient has no known allergies and does not take any daily medications. She denies taking any recent flights. Patient has no history of seasonal allergies or diabetes. She adds she had her first dose of the COVID-19 immunization on April 28th and will receive her second dose tomorrow. Patient does not have a PCP.     REVIEW OF SYSTEMS  Pertinent positives include: bilateral ear pain, green mucous production, sinus pain, sinus pressure, dental pain, headache, tactile fever, and rhinorrhea  Pertinent " negatives include: ear drainage or cough  See HPI for further details.     PAST MEDICAL HISTORY  History reviewed. No pertinent past medical history.    FAMILY HISTORY  History reviewed. No pertinent family history.    SOCIAL HISTORY  Social History     Socioeconomic History    Marital status: Single     Spouse name: None noted    Number of children: None noted    Years of education: None noted    Highest education level: None noted   Occupational History    None noted   Tobacco Use    Smoking status: Never Smoker    Smokeless tobacco: Never Used   Vaping Use    Vaping Use: Never used   Substance and Sexual Activity    Alcohol use: Not Currently    Drug use: Never    Sexual activity: Not Currently     Birth control/protection: None   Other Topics Concern    None noted   Social History Narrative    None noted     Social Determinants of Health     Financial Resource Strain:     Difficulty of Paying Living Expenses:    Food Insecurity:     Worried About Running Out of Food in the Last Year:     Ran Out of Food in the Last Year:    Transportation Needs:     Lack of Transportation (Medical):     Lack of Transportation (Non-Medical):    Physical Activity:     Days of Exercise per Week:     Minutes of Exercise per Session:    Stress:     Feeling of Stress :    Social Connections:     Frequency of Communication with Friends and Family:     Frequency of Social Gatherings with Friends and Family:     Attends Ten[employer] Services:     Active Member of Clubs or Organizations:     Attends Club or Organization Meetings:     Marital Status:    Intimate Partner Violence:     Fear of Current or Ex-Partner:     Emotionally Abused:     Physically Abused:     Sexually Abused:        SURGICAL HISTORY  History reviewed. No pertinent surgical history.    CURRENT MEDICATIONS  Home Medications       Reviewed by Angeline Goel R.N. (Registered Nurse) on 05/27/21 at 6392  Med List Status: Complete     Medication Last Dose Status        Patient  "Bryn Taking any Medications                           ALLERGIES  No Known Allergies    PHYSICAL EXAM  VITAL SIGNS: /76   Pulse 97   Temp 37.3 °C (99.1 °F) (Temporal)   Resp 18   Ht 1.575 m (5' 2\")   Wt 79.2 kg (174 lb 9.7 oz)   SpO2 95%   BMI 31.94 kg/m²      Constitutional: Alert in no apparent distress.  HENT: No purulent nasal discharge, Bilateral TM's are thickened, opacified, and erythematous, Right TM greater than left TM, No pain with movement of the pinnas, Normocephalic, Bilateral external ears normal. Nose normal.  Oropharyngeal examination reveals no evidence of erythema or exudate in posterior  oropharynx  Eyes: Pupils are equal and reactive. Conjunctiva normal, non-icteric.   Neck: No nuchal rigidity, Slightly enlarged anterior cervical lymph nodes   Thorax & Lungs: Easy unlabored respirations  Skin: Visualized skin is  Dry, No erythema, No rash.   Extremities:  FROM to all extremities  Neurologic: Alert, clear speech  Psychiatric: Affect and Mood normal    COURSE & MEDICAL DECISION MAKING          5:59 PM - Patient seen and examined at bedside. Discussed plan of care, including ordering the patient antibiotics to treat her symptoms and encouraging her to take over the counter Sudafed. Patient was told to push back the date of her second dose of the COVID-19 immunization due to her recent illness. She is understanding and agreeable to the plan of care. Patient had the opportunity to ask any questions. The plan for discharge was discussed with the patient and she was told to to return for any new or worsening symptoms and to follow up with her PCP. Patient is understanding and agreeable to the plan for discharge.      Patient presents to the ER with complaint of bilateral ear pain, right greater than left.  She says of the right ear started hurting about a week ago.  The left ear started hurting her a day or so ago.  Over the last week she has had some runny nose and nasal congestion.  She " said she blew her nose and that is when the ear pain started.  She describes having some green nasal discharge for the last 3 days.  Today she started feeling little bit of pain and pressure in her sinuses and in her upper teeth.  No fever.  The ear pain seems to be fairly well alleviated by Tylenol.  However once the Tylenol wears off the ear pain comes back.  No drainage from her ears.  No history of ear problems or ear infections in the past.  Bilateral TMs are opacified and erythematous and thickened.  This time she has a bilateral otitis media.  I suspect this is secondary to the upper respiratory infection that she has.  She got her first Moderna and a Covid vaccine about a month ago.  She is due for another dose this week.  I told her to be sure she talked to the clinic that was going to give her the vaccine to be sure she could take the second dose of vaccine while she is ill with URI and otitis media.  Patient understands and agrees.  She is well-appearing.  She is not septic or toxic.  She has not had a fever.  No nuchal rigidity.  No evidence for meningitis.  No evidence for mastoiditis or otitis externa.  She is not a diabetic.  She will go home with Augmentin for 10 days.  She has been encouraged to use over-the-counter Tylenol, Advil and Sudafed.  She has been given strict return precautions and discharge instructions and she understands treatment plan and follow-up.    PPE Note: I personally donned full PPE for all patient encounters during this visit, including wearing an N95 respirator mask, gloves, and eye protection. Scribe remained outside the patient's room and did not have any contact with the patient for the duration of patient encounter.       The patient will return for new or worsening symptoms and is stable at the time of discharge.    DISPOSITION:  Patient will be discharged home in stable condition.    FOLLOW UP:  Juan Arriaga M.D.  [address]  [address]  [place]  [id number]  [phone]    Schedule an appointment as soon as possible for a visit in 3 days  If symptoms worsen return to ER      OUTPATIENT MEDICATIONS:  Discharge Medication List as of 5/27/2021  6:18 PM        START taking these medications    Details   amoxicillin-clavulanate (AUGMENTIN) 875-125 MG Tab Take 1 tablet by mouth 2 times a day for 10 days., Disp-20 tablet, R-0, Normal              FINAL IMPRESSION  1. Acute otitis media, unspecified otitis media type       This dictation has been created using voice recognition software. The accuracy of the dictation is limited by the abilities of the software. I expect there may be some errors of grammar and possibly content. I made every attempt to manually correct the errors within my dictation. However, errors related to voice recognition software may still exist and should be interpreted within the appropriate context.     Elly PAYNE (Scribe), am scribing for, and in the presence of, Sarah Melgoza M.D..    Electronically signed by: Elly Yanez (Yaniibe), 5/27/2021    ISarah M.D. personally performed the services described in this documentation, as scribed by Elly Yanez in my presence, and it is both accurate and complete. E    The note accurately reflects work and decisions made by me.  Sarah Melgoza M.D.  5/27/2021  6:29 PM

## 2021-05-28 NOTE — DISCHARGE INSTRUCTIONS
Follow-up with [facility] early this coming week.  Please call for appointment.    Return to the ER for any worsening ear pain, for drainage of blood or pus from your ear, headache, stiff neck, fevers over 100.4, shaking chills, dizziness, worsening sinus pain or pressure, or for any concerns.    You can take over-the-counter Sudafed to help clear up some of the fluid behind your ears.    Take over-the-counter Tylenol and Advil for discomfort.

## 2021-05-28 NOTE — ED NOTES
"Pt discharged home via ambulatory to lobby with steady gait, AOx4, family accompanying. Pt in possession of belongings. Pt provided discharge education and information pertaining to medications and follow up appointments. Pt received copy of discharge instructions and verbalized understanding.     /76   Pulse 97   Temp 37.3 °C (99.1 °F) (Temporal)   Resp 18   Ht 1.575 m (5' 2\")   Wt 79.2 kg (174 lb 9.7 oz)   SpO2 95%   BMI 31.94 kg/m²   "

## 2022-12-23 NOTE — PROGRESS NOTES
Pt. Here for OB/FU. Reports Good FM.   Good # [phone]  Pt. Denies VB, LOF, or UC's.   Pharmacy verified.   Chaperone offered and not indicated  Pt states no complaints or concerns today.      
S:  Pt is  at 40w0d here for routine OB follow up.  Has not been seen since March.  Reports good FM.  Denies VB, LOF, RUCs, or vaginal DC. Denies cough, SOB, sore throat or fever.  Denies exposure to anyone with COVID 19.    O:  Please see above vitals.        FHTs: 142        Fundal ht: 36        Fetal position: vertex        SVE: 3/60/-2    A:  IUP at 40w0d  Patient Active Problem List    Diagnosis Date Noted   • Abnormal pregnancy US - Dr Xiong US [  ]  2020   • Encounter for supervision of normal pregnancy 2020   • Late prenatal care affecting pregnancy in third trimester 2020       P:  1.  Continue FKCs.         2.  Labor precautions given.  Instructions given on where to go.  Pt receptive to education.         3.  D/w pt IOL policy.  IOL referral placed.        4.  Questions answered.         5.  Encouraged adequate water intake       6.  F/u 1wk       7.  Encouraged good hand hygiene, social distancing and avoiding sick contacts.       8.  L&D policies reviewed.       9.  GBS obtained.    Chaperone offered and provided by Anabel Maurer MA.    
15-Dec-2022 22:49

## 2024-01-01 NOTE — CARE PLAN
Problem: Communication  Goal: The ability to communicate needs accurately and effectively will improve  Outcome: PROGRESSING AS EXPECTED  Note: Reviewed plan of care with patient who verbalized understanding.      Problem: Altered physiologic condition related to immediate post-delivery state and potential for bleeding/hemorrhage  Goal: Patient physiologically stable as evidenced by normal lochia, palpable uterine involution and vital signs within normal limits  Outcome: PROGRESSING AS EXPECTED  Note: Fundus firm.  Lochia scant to light.  Vital signs WNL      Problem: Potential for postpartum infection related to presence of episiotomy/vaginal tear and/or uterine contamination  Goal: Patient will be absent from signs and symptoms of infection  Outcome: PROGRESSING AS EXPECTED  Note: Patient is afebrile.      
  Problem: Infection  Goal: Will remain free from infection  Outcome: PROGRESSING AS EXPECTED   Perineum is slightly swollen related to  episiotomy repair, Tucks and spray given. Educated on perineal care and hand hygiene.       Problem: Pain Management  Goal: Pain level will decrease to patient's comfort goal  Outcome: PROGRESSING AS EXPECTED  Pain well controlled with Tucks and spray. Ibuprofen ordered for pain.      
  Problem: Knowledge Deficit  Goal: Knowledge of disease process/condition, treatment plan, diagnostic tests, and medications will improve  Outcome: PROGRESSING AS EXPECTED  Note: Discussed poc and discharge education. All questions answered.      Problem: Discharge Barriers/Planning  Goal: Patient's continuum of care needs will be met  Outcome: PROGRESSING AS EXPECTED  Note: Discharge education done and all questions answered     
Statement Selected